# Patient Record
Sex: FEMALE | Race: WHITE | URBAN - METROPOLITAN AREA
[De-identification: names, ages, dates, MRNs, and addresses within clinical notes are randomized per-mention and may not be internally consistent; named-entity substitution may affect disease eponyms.]

---

## 2023-10-13 ENCOUNTER — OFFICE VISIT (OUTPATIENT)
Dept: NEUROLOGY | Age: 38
End: 2023-10-13
Payer: COMMERCIAL

## 2023-10-13 VITALS
HEART RATE: 79 BPM | WEIGHT: 132.2 LBS | BODY MASS INDEX: 22.57 KG/M2 | DIASTOLIC BLOOD PRESSURE: 68 MMHG | SYSTOLIC BLOOD PRESSURE: 116 MMHG | HEIGHT: 64 IN | OXYGEN SATURATION: 99 %

## 2023-10-13 DIAGNOSIS — G43.E19 INTRACTABLE CHRONIC MIGRAINE WITH AURA AND WITHOUT STATUS MIGRAINOSUS: Primary | ICD-10-CM

## 2023-10-13 PROCEDURE — 99205 OFFICE O/P NEW HI 60 MIN: CPT | Performed by: STUDENT IN AN ORGANIZED HEALTH CARE EDUCATION/TRAINING PROGRAM

## 2023-10-13 RX ORDER — RIMEGEPANT SULFATE 75 MG/75MG
75 TABLET, ORALLY DISINTEGRATING ORAL PRN
Qty: 8 TABLET | Refills: 2 | Status: SHIPPED | OUTPATIENT
Start: 2023-10-13

## 2023-10-13 RX ORDER — RIMEGEPANT SULFATE 75 MG/75MG
TABLET, ORALLY DISINTEGRATING ORAL
Qty: 8 TABLET | Refills: 0 | Status: SHIPPED | COMMUNITY
Start: 2023-10-13

## 2023-10-13 NOTE — PROGRESS NOTES
Consult Note  [facility]  Patient Name: Pranay Chapman  : 1985        Subjective:   Reason for consult:   Patient seen and examined. Chart reviewed in detail. 42 y.o. -female with PMH migraines, four live births, presenting to [facility] for headaches. These have been ongoing for the past 30 years. The headache is unilateral periorbital then pressure holocephalic. They describe it as throbbing in nature. There is associated photo/phonophobia. They admit to nausea. These always last >4 hours and typically last 3-4 days. Has 2-3 days of anxiety leading up to her migraine. She does describe aura with their migraine. Total vision loss lasts up to 40 min, causing her problems at work. Discussed increased risk of stroke with migraine aura and decrease subsequently when on appropriate preventative migraine regimen. Red flag features: headaches wake her from sleep, aura is changing    Causing her difficulty at work and at home with 4 kiddos, ages 1-12. Migraine History:  Triggers: dairy, wine, grapes    Baseline headache frequency: 24-30/30 days per month  Baseline migraine frequency: 24-30/30 days per month    Prior Preventative medications tried: Katvidalen Davon (had severe nightmares and depression), massage, dietary changes, acupuncture, Mg, B2 (helped a little), topamax (extreme fatigue, ineffective), propranolol (BP dropped severely low)  Prior abortive medications tried: Fiorcet, imitrex (worked initially, now ineffective), Ubrelvy (worked pretty well but worsened frequency of visual loss), injectible triptan (ineffective), maxalt (ineffective    Denies history of heart disease. No past medical history on file. :   No past surgical history on file.     Allergies   Allergen Reactions    Butalbital-Apap-Caffeine Headaches     Other reaction(s): GI Intolerance, GI intolerance    Patient denies others  Social History     Socioeconomic History    Marital status:

## 2023-10-25 ENCOUNTER — TELEPHONE (OUTPATIENT)
Dept: NEUROLOGY | Age: 38
End: 2023-10-25

## 2024-01-16 ENCOUNTER — OFFICE VISIT (OUTPATIENT)
Dept: NEUROLOGY | Age: 39
End: 2024-01-16
Payer: COMMERCIAL

## 2024-01-16 VITALS
WEIGHT: 133 LBS | SYSTOLIC BLOOD PRESSURE: 124 MMHG | BODY MASS INDEX: 22.71 KG/M2 | HEIGHT: 64 IN | OXYGEN SATURATION: 97 % | DIASTOLIC BLOOD PRESSURE: 90 MMHG | HEART RATE: 71 BPM

## 2024-01-16 DIAGNOSIS — G43.E19 INTRACTABLE CHRONIC MIGRAINE WITH AURA AND WITHOUT STATUS MIGRAINOSUS: Primary | ICD-10-CM

## 2024-01-16 PROCEDURE — G8427 DOCREV CUR MEDS BY ELIG CLIN: HCPCS | Performed by: NURSE PRACTITIONER

## 2024-01-16 PROCEDURE — 1036F TOBACCO NON-USER: CPT | Performed by: NURSE PRACTITIONER

## 2024-01-16 PROCEDURE — 99213 OFFICE O/P EST LOW 20 MIN: CPT | Performed by: NURSE PRACTITIONER

## 2024-01-16 PROCEDURE — G8420 CALC BMI NORM PARAMETERS: HCPCS | Performed by: NURSE PRACTITIONER

## 2024-01-16 PROCEDURE — G8484 FLU IMMUNIZE NO ADMIN: HCPCS | Performed by: NURSE PRACTITIONER

## 2024-01-16 RX ORDER — TIZANIDINE 4 MG/1
4 TABLET ORAL NIGHTLY
Qty: 30 TABLET | Refills: 3 | Status: SHIPPED | OUTPATIENT
Start: 2024-01-16

## 2024-01-16 NOTE — PROGRESS NOTES
1/16/24    Brittney Herrera  1985    Chief Complaint   Patient presents with    Follow-up     Headaches        History of Present Illness  Brittney is a 38 y.o. female presenting today for follow-up of: Chronic migraine with aura.  To evaluate her changing migraine aura consisting of prolonged periods of total vision loss bilaterally, MRI brain with and without contrast was ordered to rule out structural lesion causing this change.  MRI was not completed, she does me she cannot afford it.  She recently had a kidney stone, will stay away from Topamax or zonisamide.  She tells me Nurtec is really helpful for abortive therapy.  In regards to Botox, she tells me her insurance covers it but she cannot meet the deductible.  She would like to discuss other options for migraine prevention.    Migraine History:  Triggers: dairy, wine, grapes     Baseline headache frequency: 24-30/30 days per month  Baseline migraine frequency: 24-30/30 days per month     Prior Preventative medications tried: Qulipta (had severe nightmares and depression), massage, dietary changes, acupuncture, Mg, B2 (helped a little), topamax (extreme fatigue, ineffective), propranolol (BP dropped severely low)  Prior abortive medications tried: Fiorcet, imitrex (worked initially, now ineffective), Ubrelvy (worked pretty well but worsened frequency of visual loss), injectible triptan (ineffective), maxalt (ineffective     Current Outpatient Medications   Medication Sig Dispense Refill    Rimegepant Sulfate (NURTEC) 75 MG TBDP Take 75 mg by mouth as needed (Migraine) Not to exceed more than 1 tab in 24 hours 8 tablet 2    Rimegepant Sulfate (NURTEC) 75 MG TBDP Lot: [id number] Exp: 8/25 (Patient not taking: Reported on 1/16/2024) 8 tablet 0     No current facility-administered medications for this visit.       Physical Exam:  Mental Status   Orientation: oriented to person, oriented to place, oriented to problem, and oriented to

## 2024-02-01 DIAGNOSIS — G43.E19 INTRACTABLE CHRONIC MIGRAINE WITH AURA AND WITHOUT STATUS MIGRAINOSUS: ICD-10-CM

## 2024-02-01 NOTE — TELEPHONE ENCOUNTER
Patient needs a refill on   Rimegepant Sulfate (NURTEC) 75 MG TBDP .   Pharmacy:   [facility] [id number] - [address] [place]  [place] [phone] - F [phone] [[id number]]

## 2024-02-05 RX ORDER — RIMEGEPANT SULFATE 75 MG/75MG
75 TABLET, ORALLY DISINTEGRATING ORAL PRN
Qty: 8 TABLET | Refills: 2 | Status: SHIPPED | OUTPATIENT
Start: 2024-02-05

## 2024-02-07 NOTE — TELEPHONE ENCOUNTER
Patient called to check on status of refill request asked if she can  samples while she waits on response from PA because she feels a migraine coming on .

## 2024-03-14 ENCOUNTER — TELEPHONE (OUTPATIENT)
Dept: NEUROLOGY | Age: 39
End: 2024-03-14

## 2024-03-14 NOTE — TELEPHONE ENCOUNTER
Submitted PA via covermymeds for [facility], pending outcome.

## 2024-03-15 ENCOUNTER — TELEPHONE (OUTPATIENT)
Dept: NEUROLOGY | Age: 39
End: 2024-03-15

## 2024-03-15 NOTE — TELEPHONE ENCOUNTER
Insurance denied nurte.  States she must have 14 days of two of the following therapies :    Medication which include naratriptan, rizatriptan, and sumatriptan.        I have that she has tried one injection form of triptan.

## 2024-03-21 NOTE — TELEPHONE ENCOUNTER
Received call from pt stating [facility] but [facility] was approved previously. Informed that the office would resubmit auth with additional information. She requested samples of Nurtec. Informed ok to  samples while processing re-auth. Resubmitted auth through covermymeds.

## 2024-03-22 NOTE — TELEPHONE ENCOUNTER
Received another denial stating pt has not tried triptan. Faxed chart notes with request for insurance to reference tried and failed meds per chart notes.

## 2024-04-19 ENCOUNTER — TELEPHONE (OUTPATIENT)
Dept: NEUROLOGY | Age: 39
End: 2024-04-19

## 2024-04-19 RX ORDER — RIMEGEPANT SULFATE 75 MG/75MG
TABLET, ORALLY DISINTEGRATING ORAL
Qty: 4 TABLET | Refills: 0 | COMMUNITY
Start: 2024-04-19

## 2024-04-19 NOTE — TELEPHONE ENCOUNTER
Patient called stated Nurtec Prior Auth did not go through and requested a sample because she has a migraine starting. I spoke with Miguel and she said to give sample.

## 2024-05-13 ENCOUNTER — OFFICE VISIT (OUTPATIENT)
Dept: NEUROLOGY | Age: 39
End: 2024-05-13
Payer: COMMERCIAL

## 2024-05-13 VITALS
OXYGEN SATURATION: 98 % | SYSTOLIC BLOOD PRESSURE: 120 MMHG | BODY MASS INDEX: 23 KG/M2 | WEIGHT: 134 LBS | DIASTOLIC BLOOD PRESSURE: 78 MMHG | HEART RATE: 70 BPM

## 2024-05-13 DIAGNOSIS — G43.E19 INTRACTABLE CHRONIC MIGRAINE WITH AURA AND WITHOUT STATUS MIGRAINOSUS: ICD-10-CM

## 2024-05-13 PROCEDURE — 99214 OFFICE O/P EST MOD 30 MIN: CPT | Performed by: NURSE PRACTITIONER

## 2024-05-13 RX ORDER — METHYLPREDNISOLONE 4 MG/1
TABLET ORAL
Qty: 1 KIT | Refills: 0 | Status: SHIPPED | OUTPATIENT
Start: 2024-05-13

## 2024-05-13 RX ORDER — UBROGEPANT 100 MG/1
TABLET ORAL
Qty: 1 TABLET | Refills: 0 | Status: SHIPPED | COMMUNITY
Start: 2024-05-13

## 2024-05-13 RX ORDER — UBROGEPANT 100 MG/1
TABLET ORAL
Qty: 3 TABLET | Refills: 0 | Status: SHIPPED | COMMUNITY
Start: 2024-05-13

## 2024-05-13 RX ORDER — RIMEGEPANT SULFATE 75 MG/75MG
75 TABLET, ORALLY DISINTEGRATING ORAL EVERY OTHER DAY
Qty: 16 TABLET | Refills: 5 | Status: SHIPPED | OUTPATIENT
Start: 2024-05-13

## 2024-05-13 RX ORDER — UBROGEPANT 100 MG/1
100 TABLET ORAL PRN
Qty: 10 TABLET | Refills: 2 | Status: SHIPPED | OUTPATIENT
Start: 2024-05-13

## 2024-05-13 NOTE — PROGRESS NOTES
5/13/24    Brittney Herrera  1985    Chief Complaint   Patient presents with    Follow-up     Intractable chronic migraine with aura and without status migrainosus       History of Present Illness  Brittney is a 38 y.o. female presenting today for follow-up of: Chronic migraine with aura.  She is on tizanidine 4 mg for preventative therapy and she uses Nurtec for abortive therapy.  The tizanidine made her dizzy and drowsy so she discontinued it.  She currently has a migraine.  It started 2 days ago.    Migraine History:  Triggers: dairy, wine, grapes     Baseline headache frequency: 24-30/30 days per month  Baseline migraine frequency: 24-30/30 days per month     Prior Preventative medications tried: Qulipta (had severe nightmares and depression), massage, dietary changes, acupuncture, Mg, B2 (helped a little), topamax (extreme fatigue, ineffective), propranolol (BP dropped severely low)  Prior abortive medications tried: Fiorcet, imitrex (worked initially, now ineffective), Ubrelvy (worked pretty well but worsened frequency of visual loss), injectible triptan (ineffective), maxalt (ineffective      Current Outpatient Medications   Medication Sig Dispense Refill    methylPREDNISolone (MEDROL DOSEPACK) 4 MG tablet Take by mouth. 1 kit 0    rimegepant sulfate (NURTEC) 75 MG TBDP Take 75 mg by mouth every other day Not to exceed more than 1 tab in 24 hours 16 tablet 5    Ubrogepant (UBRELVY) 100 MG TABS Take 100 mg by mouth as needed (Migraine) May repeat after 2 hours if needed 10 tablet 2    rimegepant sulfate (NURTEC) 75 MG TBDP Lot [id number] Exp  (2 boxes) (Patient not taking: Reported on 5/13/2024) 4 tablet 0    tiZANidine (ZANAFLEX) 4 MG tablet Take 1 tablet by mouth nightly Take 1/2 tab nightly for 8 nights then increase to full tab. (Patient not taking: Reported on 5/13/2024) 30 tablet 3     No current facility-administered medications for this visit.       Physical Exam:  Mental Status   Orientation:

## 2024-05-15 ENCOUNTER — TELEPHONE (OUTPATIENT)
Dept: NEUROLOGY | Age: 39
End: 2024-05-15

## 2024-05-28 ENCOUNTER — TELEPHONE (OUTPATIENT)
Dept: NEUROLOGY | Age: 39
End: 2024-05-28

## 2024-05-28 NOTE — TELEPHONE ENCOUNTER
Estela from [facility] called to confirm that pt was taking both nurtec and ubrelvy, I informed estela that pt takes Nurtec every other day for preventative therapy and Ubrelvy for abortive therapy. Estela voiced understanding.

## 2024-06-12 ENCOUNTER — TELEPHONE (OUTPATIENT)
Dept: NEUROLOGY | Age: 39
End: 2024-06-12

## 2024-07-24 ENCOUNTER — HOSPITAL ENCOUNTER (OUTPATIENT)
Dept: CT IMAGING | Age: 39
Discharge: HOME | End: 2024-07-24
Attending: SPECIALIST
Payer: COMMERCIAL

## 2024-07-24 DIAGNOSIS — R31.0 GROSS HEMATURIA: ICD-10-CM

## 2024-07-24 PROCEDURE — 6360000004 HC RX CONTRAST MEDICATION: Performed by: SPECIALIST

## 2024-07-24 PROCEDURE — 74178 CT ABD&PLV WO CNTR FLWD CNTR: CPT

## 2024-07-24 RX ADMIN — IOPAMIDOL 125 ML: 755 INJECTION, SOLUTION INTRAVENOUS at 11:35

## 2024-10-22 RX ORDER — UBROGEPANT 100 MG/1
TABLET ORAL
Qty: 10 TABLET | Refills: 5 | Status: SHIPPED | OUTPATIENT
Start: 2024-10-22

## 2024-11-15 ENCOUNTER — OFFICE VISIT (OUTPATIENT)
Dept: NEUROLOGY | Age: 39
End: 2024-11-15
Payer: COMMERCIAL

## 2024-11-15 VITALS
WEIGHT: 141.3 LBS | BODY MASS INDEX: 24.25 KG/M2 | DIASTOLIC BLOOD PRESSURE: 72 MMHG | HEART RATE: 80 BPM | OXYGEN SATURATION: 99 % | SYSTOLIC BLOOD PRESSURE: 118 MMHG

## 2024-11-15 DIAGNOSIS — G43.E19 INTRACTABLE CHRONIC MIGRAINE WITH AURA AND WITHOUT STATUS MIGRAINOSUS: ICD-10-CM

## 2024-11-15 PROCEDURE — 99213 OFFICE O/P EST LOW 20 MIN: CPT | Performed by: NURSE PRACTITIONER

## 2024-11-15 RX ORDER — UBROGEPANT 100 MG/1
TABLET ORAL
Qty: 10 TABLET | Refills: 8 | Status: SHIPPED | OUTPATIENT
Start: 2024-11-15

## 2024-11-15 RX ORDER — RIMEGEPANT SULFATE 75 MG/75MG
75 TABLET, ORALLY DISINTEGRATING ORAL EVERY OTHER DAY
Qty: 16 TABLET | Refills: 11 | Status: SHIPPED | OUTPATIENT
Start: 2024-11-15

## 2024-11-15 NOTE — PROGRESS NOTES
11/15/24    Brittney Herrera  1985    Chief Complaint   Patient presents with    Follow-up     Intractable chronic migraine with aura and without status migrainosus       History of Present Illness  Brittney is a 38 y.o. female presenting today for follow-up of: Chronic migraine with aura.  Last visit,   She could not tolerate for tizanidine so she was switched to Nurtec every other day for preventative therapy and Ubrelvy for abortive therapy.  She was given a Medrol Dosepak last visit to break her migraine cycle.    Today, she tells me she feels the best she ever has.  Rarely gets a breakthrough migraine.  Nurtec for preventative therapy has been extremely effective, she only gets 1 or 2 migraines per month easily aborted by Ubrelvy.  She tolerates both very well.  Is very satisfied with her current treatment of her migraines.    migraine History:  Triggers: dairy, wine, grapes     Baseline headache frequency: 24-30/30 days per month  Baseline migraine frequency: 24-30/30 days per month     Prior Preventative medications tried: Qulipta (had severe nightmares and depression), massage, dietary changes, acupuncture, Mg, B2 (helped a little), topamax (extreme fatigue, ineffective), propranolol (BP dropped severely low) tizanidine (dizzy)  Prior abortive medications tried: Fiorcet, imitrex (worked initially, now ineffective), Ubrelvy (worked pretty well but worsened frequency of visual loss), injectible triptan (ineffective), maxalt (ineffective       Current Outpatient Medications   Medication Sig Dispense Refill    rimegepant sulfate (NURTEC) 75 MG TBDP Take 75 mg by mouth every other day Not to exceed more than 1 tab in 24 hours 16 tablet 11    Ubrogepant (UBRELVY) 100 MG TABS Take 1 tablet by mouth at onset of headache, may repeat 1 tablet in 2 hours if needed 10 tablet 8     No current facility-administered medications for this visit.     /72 (Site: Left Upper Arm)   Pulse 80   Wt 64.1 kg (141 lb 4.8

## 2024-12-04 ENCOUNTER — TELEPHONE (OUTPATIENT)
Dept: NEUROLOGY | Age: 39
End: 2024-12-04

## 2025-04-21 ENCOUNTER — TRANSCRIBE ORDERS (OUTPATIENT)
Dept: ADMINISTRATIVE | Age: 40
End: 2025-04-21

## 2025-04-21 DIAGNOSIS — R31.0 GROSS HEMATURIA: Primary | ICD-10-CM

## 2025-04-21 DIAGNOSIS — N20.1 CALCULUS OF URETER: ICD-10-CM

## 2025-04-28 ENCOUNTER — HOSPITAL ENCOUNTER (OUTPATIENT)
Dept: CT IMAGING | Age: 40
Discharge: HOME | End: 2025-04-28
Attending: SPECIALIST
Payer: COMMERCIAL

## 2025-04-28 DIAGNOSIS — R31.0 GROSS HEMATURIA: ICD-10-CM

## 2025-04-28 DIAGNOSIS — N20.1 CALCULUS OF URETER: ICD-10-CM

## 2025-04-28 PROCEDURE — 74176 CT ABD & PELVIS W/O CONTRAST: CPT

## 2025-05-20 ENCOUNTER — TRANSCRIBE ORDERS (OUTPATIENT)
Dept: ADMINISTRATIVE | Age: 40
End: 2025-05-20

## 2025-05-20 DIAGNOSIS — N20.1 CALCULUS OF URETER: Primary | ICD-10-CM

## 2025-05-20 DIAGNOSIS — R31.9 HEMATURIA SYNDROME: ICD-10-CM

## 2025-09-04 RX ORDER — UBROGEPANT 100 MG/1
TABLET ORAL
Qty: 10 TABLET | Refills: 8 | Status: SHIPPED | OUTPATIENT
Start: 2025-09-04